# Patient Record
Sex: FEMALE | Race: WHITE | NOT HISPANIC OR LATINO | URBAN - NONMETROPOLITAN AREA
[De-identification: names, ages, dates, MRNs, and addresses within clinical notes are randomized per-mention and may not be internally consistent; named-entity substitution may affect disease eponyms.]

---

## 2017-03-06 ENCOUNTER — OUTSIDE FACILITY SERVICE (OUTPATIENT)
Dept: CARDIOLOGY | Facility: CLINIC | Age: 37
End: 2017-03-06

## 2017-03-06 PROCEDURE — 93306 TTE W/DOPPLER COMPLETE: CPT | Performed by: INTERNAL MEDICINE

## 2022-11-01 ENCOUNTER — TELEPHONE (OUTPATIENT)
Dept: UROLOGY | Age: 42
End: 2022-11-01

## 2022-11-01 NOTE — TELEPHONE ENCOUNTER
Patient states she is trying to transfer her care from Dr. Yung Heart office at [facility] to this office. She states she has tried to get them to fax records multiple times but they have been rude. I provided patient with office fax number. She states she will reach out to them again. Patient states she has had a lot of testing done and when she gets an appointment with this office she will provide a disk of her imaging.

## 2024-03-14 ENCOUNTER — PROCEDURE VISIT (OUTPATIENT)
Dept: OTOLARYNGOLOGY | Facility: CLINIC | Age: 44
End: 2024-03-14
Payer: COMMERCIAL

## 2024-03-14 ENCOUNTER — OFFICE VISIT (OUTPATIENT)
Dept: OTOLARYNGOLOGY | Facility: CLINIC | Age: 44
End: 2024-03-14
Payer: COMMERCIAL

## 2024-03-14 VITALS
BODY MASS INDEX: 34.83 KG/M2 | SYSTOLIC BLOOD PRESSURE: 130 MMHG | HEART RATE: 72 BPM | TEMPERATURE: 98.2 F | DIASTOLIC BLOOD PRESSURE: 94 MMHG | HEIGHT: 60 IN | WEIGHT: 177.38 LBS

## 2024-03-14 DIAGNOSIS — H81.11 BPPV (BENIGN PAROXYSMAL POSITIONAL VERTIGO), RIGHT: Primary | ICD-10-CM

## 2024-03-14 DIAGNOSIS — H81.11 BPPV (BENIGN PAROXYSMAL POSITIONAL VERTIGO), RIGHT: ICD-10-CM

## 2024-03-14 DIAGNOSIS — Z01.10 HEARING WITHIN NORMAL LIMITS IN BOTH EARS: ICD-10-CM

## 2024-03-14 DIAGNOSIS — H93.299: ICD-10-CM

## 2024-03-14 DIAGNOSIS — H93.13 TINNITUS, BILATERAL: ICD-10-CM

## 2024-03-14 DIAGNOSIS — R42 VERTIGO: Primary | ICD-10-CM

## 2024-03-14 DIAGNOSIS — R11.0 NAUSEA: ICD-10-CM

## 2024-03-14 RX ORDER — ATORVASTATIN CALCIUM 10 MG/1
TABLET, FILM COATED ORAL
COMMUNITY

## 2024-03-14 RX ORDER — MONTELUKAST SODIUM 10 MG/1
10 TABLET ORAL DAILY
COMMUNITY

## 2024-03-14 RX ORDER — ADALIMUMAB 80MG/0.8ML
KIT SUBCUTANEOUS
COMMUNITY

## 2024-03-14 RX ORDER — MOMETASONE FUROATE AND FORMOTEROL FUMARATE DIHYDRATE 100; 5 UG/1; UG/1
AEROSOL RESPIRATORY (INHALATION)
COMMUNITY

## 2024-03-14 RX ORDER — MECLIZINE HYDROCHLORIDE 25 MG/1
25 TABLET ORAL 3 TIMES DAILY PRN
Qty: 60 TABLET | Refills: 0 | Status: SHIPPED | OUTPATIENT
Start: 2024-03-14

## 2024-03-14 RX ORDER — ALBUTEROL SULFATE 90 UG/1
2 AEROSOL, METERED RESPIRATORY (INHALATION) EVERY 4 HOURS PRN
COMMUNITY
Start: 2023-04-19

## 2024-03-14 NOTE — PATIENT INSTRUCTIONS
Nasal steroid use:  Using nasal steroids:  You will be prescribed one of the following nasal steroids: Flonase, Nasacort, Nasonex, Rhinocort, Qnasl, Zetonna  2 puffs each nostril 2 times daily  Start as soon as possible  If you are using Afrin for 3 days with the nasal steroid,  Use Afrin first and wait 10 minutes to allow the nose to open. Then administer nasal steroids.     NASAL SALINE:  Use 2 puffs each nostril 4-6 times daily and more frequently if possible.  You can buy saline spray or you can make your own and use an old spray bottle to administer  Use a humidifier at bedside  Recipe for saline:  Water                                 1 quart  Salt (table)                        1 tablespoon  Gylcerin (or Shante Syrup)    1 teaspoon  Sodium bicarbonate           1 teaspoon  Sprays or Darshana pots are recommended    Do not allow to stand for more than 24 hrs. Make new solution. There is no preservative in this solution.    Sinus irrigation and saline application can be enhanced with various over-the-counter products.  A WaterPik can be quite useful to irrigate, especially following sinus surgery.  Navage makes a product that irrigates the nose and some of the sinuses.  NeilMed makes a sign you Gator to irrigate the sinuses.  Neomed also has canned saline that we will come out under pressure.  A De Leon pot can also be helpful.  All of these products help keep the nose clear of debris.  Please use as directed on the instructions that come with the particular device.     TINNITUS  Tinnitus (latin for ringing) is the sensation of noise in the ears. This symptom can be quite variable and disconcerting. Most people have had ringing in the ears but most do not require treatment. Only 6% of people have ringing troubling enough to seek treatment.    Symptoms can range from ringing to “noise” of any sort in the ear or ears. Timing can vary as well. There are no specific symptom requirements to have tinnitus. It is  speculated that the inner ear hair cells (responsible for hearing) may be dying and causing the brain to seek additional input for sound that is missing. There are many theories for the etiology of tinnitus or ringing.    You may be referred for hearing testing or imaging of the ears/brain to try to determine what is causing ringing and how to best treat the condition.    Tinnitus Recommendations-  >Avoid loud or sudden noise  >Wear hearing protection in the presence of loud noise  >Avoid irritants like caffeine, nicotine, tonic water, malaria medications, alcohol, aspirin- please tell MD if you are taking any of these medications  >In a quiet environment or while sleeping, use a white noise generator or radio station to provide background noise  >Relaxation and stress management techniques are useful  >Biofeedback  >Complementary medicine may be of benefit  >Wear a hearing aid or tinnitus masker/retrainer  >Psychological counseling may be beneficial in some situations  >Exercise!!  >Restorative Sleep!!    Medical therapy for ringing (may include)-  Do nothing  Medications for ringing  IV medication  Ear perfusion- inner ear surgery to reduce ringing  Hearing Aids, Tinnitus maskers, Tinnitus retrainers  Biofeedback  Psychological counseling    All options will be reviewed at your visit. Treating tinnitus can be difficult and frustrating. There really is no cure but rather control. This can be time consuming to treat. The patient determines how much treatment is warranted if there are no associated medical conditions requiring therapy. Please be patient.     VESTIBULAR EXERCISES:    Goals:  >To develop proprioceptive and visual mechanisms to compensate for a disturbance in balance function (labyrinthine system)  >To improve muscle coordination in general  T>o practice balancing under everyday conditions with special attention to using the eyes,  muscle and joint senses  >To train the movement of the eyes independent  "of the head  >To loosen the muscles of the neck and shoulder to overcome the protective muscular spasm and tendency to move \"in one piece\"  >To practice head movements that cause dizziness and thus gradually overcome the disability  >To encourage the restoration of self-confidence and easy spontaneous motion     Exercise Program:  A. Eye exercises (while seated in bed). Perform 5 to 10 minutes at least 5 times each day; first do slowly, then quickly. Perform 20 times each.  1.     Up and down  2.     Side to side  3.     Diagonal  4.     Focus on finger moving from 3 feet to one foot from face    B. Head exercises. To be done at first slowly with eyes open in primary position, then quickly. Later do with eyes closed. Perform 20 times each.  1.     Bending forward and backward  2.     Turning from side to side  3.     Tilting from side to side  4.     Diagonal movements    C. Coordinate the movement of head and eyes in the same direction as in B.    D. Shoulder shrugging and circling. Perform 20 times each.    E. While seated, bend forward and  objects from the ground. Perform 20 times.    F.  Standing exercises. Perform 20 times each.  1. Repeat section A  2. Change from a sitting to a standing position with the eyes open and shut.  3. Throw ball from hand to hand, above eye level             4. Throw ball from hand to hand, under knee  5. Change form from sitting to standing, turning around in between     Full activity: Perform 10 times each.  1. Walk across room with eyes open, then closed  2. Walk up and down a slope with eyes open, then closed  3. Walk up and down steps with eyes open, then closed  4. Sit up and lie down in bed  5. Stand up and sit down in a chair  6. Recover balance when pushed in any direction  7. Throw and catch a ball  8. Any game involving stooping, straining and aiming     The following are of value in rehabilitating patients with balance problems:  1. A light cane may be used, not " for walking, but to provide additional proprioceptive            orienting input.  2. While walking, the patient should not look down, but rather select a distant point for          fixation.  3. Night-lights should be left on in the patient’s home.  4. In-patients with cervical spine problems, especially in those that head turning        increase unsteadiness, the use of soft foam cervical collar limits motion and improves         stability. Exercises should be modified accordingly.  5. Mild central stimulants (Ritalin, Caffeine) are useful in alerting the patient to respond       quickly to orienting input.  6. Optimal visual correction should be achieved and maintained. If cataract surgery is           being performed, then contact lens are recommended to avoid optical distortion.  7.  Extreme fatigue and stress is to be avoided, as this may worsen the dizziness.  8.  Sedatives, vestibular suppressants and tranquilizers (Valium, Phenergan, Antivert, Dramamine, Klonopin, etc.) are to be avoided, as this may slow compensation by the brain and cause drowsiness.

## 2024-03-14 NOTE — PROGRESS NOTES
AUDIOMETRIC EVALUATION      Name:  Julee Todd  :  1980  Age:  44 y.o.  Date of Evaluation:  2024       History:  Ms. Todd is seen today for a hearing evaluation due to dizziness at the request of YISEL Cruz.    Audiologic Information:  Concerns for Hearing: No  PETs: No  Other otologic surgical history: No  Aural Pressure/Fullness: Bilateral  Otalgia: Bilateral  Otorrhea: No  Tinnitus: Intermittent bilateral ringing  Dizziness: lots of spinning/lightheaded- especially in a car  Noise Exposure: No  Family history of hearing loss: No  Head trauma requiring hospital stay: No  Chemotherapy: No  Other significant history: None    Vestibular Information:  Duration: Minutes (episodes last for a couple of days after being on a boat)  Triggers: in a car or a boat  Heart Problems: No  Back/Neck Problems: previous neck injury  Vision Problems: None  Nausea: yes  Weakness/Numbness: numbness when laying down  Motion Sickness: Yes- regularly  Migraine/Headache: No  Falls: No    **Case history obtained in office and through EMR system      EVALUATION:        RESULTS:    Otoscopic Evaluation:  Right: clear canal, tympanic membrane visualized  Left: clear canal, tympanic membrane visualized  **NOTE: Testing completed after ears were examined by ENT provider    Tympanometry (226 Hz):  Right: Type As  Left: Type As    Pure Tone Audiometry:    Right: Normal hearing thresholds  Left: Normal hearing thresholds    Speech Audiometry:   Right: Speech Reception Threshold (SRT) was obtained at 25 dB HL  Word Recognition scores - Excellent (100)% at 65 dB with 45 dB of contralateral masking, using NU-6 List 1A with 10 words  Left: Speech Reception Threshold (SRT) was obtained at 20 dB HL  Word Recognition scores - Excellent (100)% at 60 dB with 40 dB of contralateral masking, using NU-6 List 1A with 10 words  SRT/PTA in good agreement bilaterally.    Positional Screening:    Spontaneous Nystagmus Normal - No nystagmus  observed or recorded   Yanick-Hallpike Positive    Left No nystagmus observed or recorded   Right Nystagmus observed and recorded   Positional Normal   Supine No nystagmus observed or recorded   Head Right Slight nystagmus recorded.   Head Left No nystagmus observed or recorded       IMPRESSIONS:  Tympanometry showed normal middle ear pressure with reduced static compliance, consistent with hypomobile tympanic membrane, for both ears. Pure tone thresholds for both ears show normal hearing thresholds, suggesting normal outer/middle ear function and normal cochlear/retrocochlear function.Patient was counseled with regard to the findings.  Epley maneuver for right sided benign paroxysmal positional vertigo performed without incidence.      Diagnosis:  1. BPPV (benign paroxysmal positional vertigo), right    2. Tinnitus, bilateral    3. Hearing within normal limits in both ears         RECOMMENDATIONS/PLAN:  Follow-up recommendations per YISEL Cruz.  Practice good communication strategies to assist with everyday listening (eye contact with speakers, reduce background noise, encourage others to communicate clearly and slowly).  Tinnitus management strategies. Consider use of amplification, a white noise machine, low level TV/radio, or fan at night to help mask the tinnitus. It is also recommended to avoid caffeine, alcohol, tobacco, salt, high dose NSAIDs, and to increase hydration. Additional resources: Resound Relief misty and American Tinnitus Association (www.oliver.org).  Consistent utilization of hearing protection devices in noisy environments.  Repeat hearing evaluation if changes in hearing are noted or concerns arise.  Discussed results and recommendations with patient. Questions were addressed and the patient was encouraged to contact our department should concerns arise.        Padmini Wesley, CCC-A, F-AAA  Doctor of Audiology

## 2024-03-14 NOTE — PROGRESS NOTES
YISEL Sanabria  W ENT [facility] EAR NOSE & THROAT  [address], [address]  [place] [address]  Fax [phone]  Phone [phone]      Visit Type: NEW PATIENT   Chief Complaint   Patient presents with    Dizziness    Hearing Loss           Dizziness      Julee Todd is a 44 y.o. female who presents for evaluation of ent complaints. Main complaint today is dizziness. Describes dizziness as spinning and tipping sensation. Spinning it to the right. Triggered by car and boat ride. Episodes can last for several minutes to days. Associated symptoms include nausea. Rest sometimes makes better but denies significant measures to stop or lessen episodes. Takes nausea medicine that helps somewhat.  This has been present for past 2-3 years, seems to be worsening. Has not been worked up for this.     Worsening factors- crowds and back ground noise make hearing worse    Past Medical History:   Diagnosis Date    Asthma     Hidradenitis suppurativa     Hyperlipidemia        Past Surgical History:   Procedure Laterality Date    APPENDECTOMY       SECTION      HYSTERECTOMY         Family History: Her family history includes Skin cancer in her father.     Social History: She  reports that she has never smoked. She has never used smokeless tobacco. She reports that she does not drink alcohol and does not use drugs.    Home Medications:  Adalimumab, albuterol sulfate HFA, atorvastatin, meclizine, mometasone-formoterol, and montelukast    Allergies:  She has No Known Allergies.       Vital Signs:   Temp:  [98.2 °F (36.8 °C)] 98.2 °F (36.8 °C)  Heart Rate:  [72] 72  BP: (130)/(94) 130/94  ENT Physical Exam  Constitutional  Appearance: patient appears well-developed, well-nourished and well-groomed,  Communication/Voice: communication appropriate for developmental age; vocal quality normal;  Head and Face  Appearance: head appears normal, face appears normal and  face appears atraumatic;  Palpation: facial palpation normal;  Salivary: glands normal;  Ear  Hearing: intact;  Auricles: bilateral auricles normal;  External Mastoids: bilateral external mastoids normal;  Ear Canals: bilateral ear canals normal;  Tympanic Membranes: bilateral tympanic membranes normal;  Nose  External Nose: nares patent bilaterally; external nose normal;  Internal Nose: bilateral intranasal mucosa erythematous; bilateral inferior turbinates bluish;  Oral Cavity/Oropharynx  Lips: normal;  Teeth: normal;  Gums: gingiva normal;  Tongue: normal;  Oral mucosa: normal;  Hard palate: normal;  Soft palate: normal;  Tonsils: normal;  Neck  Neck: neck normal; neck palpation normal;  Respiratory  Inspection: breathing unlabored; normal breathing rate;  Cardiovascular  Inspection: extremities are warm and well perfused;  Neurovestibular  Gait: gait normal;  Station: standing is normal;  Vestibular: gaze-evoked nystagmus present; Vestibular comments: horizontal beating nystagmus noted with forced gaze to right  Mental Status: alert and oriented;           Result Review       RESULTS REVIEW    I have reviewed the patients old records in the chart.   The following results/records were reviewed:    Procedure visit with Padmini Mccracken AUD (03/14/2024)          Assessment & Plan  Vertigo    Nausea    Altered auditory perception    BPPV (benign paroxysmal positional vertigo), right           New Medications Ordered This Visit   Medications    meclizine (ANTIVERT) 25 MG tablet     Sig: Take 1 tablet by mouth 3 (Three) Times a Day As Needed for Dizziness (vertigo).     Dispense:  60 tablet     Refill:  0       Fleischmanns figueroa pike positive to right. Will treat for BPPV. Epley performed. Home exercises discussed and advised starting next week. Can use Meclizine as needed.   WE will monitor audio for now without significant abnormalities. Advised to try flonase and ear popping, can recheck hearing in 3-6 months to monitor  for change. If new symptoms develop care recheck sooner if needed.    Medical and surgical options were discussed including observation, continued medical management, medication modification, and surgical management. Risks, benefits and alternatives were discussed and questions were answered. After considering the options, the patient decided to proceed with medication modification.  Call for ear problems, especially change of hearing, ear pain or dizziness.  For the best results, use nasal sprays every day. It may take a week to build up in the nasal lining and a few more weeks to improve the eustachian tube function.  Protect getting water in the ears. If needed, may use over the counter silicone plugs or a cotton ball coated with vasoline when bathing.  Use hairdryer on a cool setting after bathing.  Use hearing protection in loud noise situations.  Use home masking techniques- use low sound level of a pleasant sound like a fan or radio at night to drown out the tinnitus sound. If not working, can consider formal masking device through our hearing aid department.  Follow a low salt diet to try to prevent inner ear fluid accumulation.  Decrease caffeine, avoid red wine, nitrites in cured meats, food additives (like monosodium glutamate/ MSG) and dyes.  EPLY MANEUVER INSTRUCTIONS: Sleep upright for the next 24 hours with the head elevated greater than 30 degrees. Refrain from bending, straining, or abrupt head movements for the next 2 weeks.   Home vestibular exercises  Start Flonase BID      Return in about 6 weeks (around 4/25/2024) for Recheck vertigo.        Electronically signed by YISEL Sanabria 03/14/24 2:49 PM CDT.

## 2024-03-22 ENCOUNTER — TELEPHONE (OUTPATIENT)
Dept: OTOLARYNGOLOGY | Facility: CLINIC | Age: 44
End: 2024-03-22
Payer: COMMERCIAL

## 2024-03-22 NOTE — TELEPHONE ENCOUNTER
Called pt and informed her to start taking the meclizine and I would notify the APRN's nurse and they will get back with her next week. Pt verbalized understanding.

## 2024-03-22 NOTE — TELEPHONE ENCOUNTER
Pt LVM stating that her eply maneuver did not help. She states that the aprn said to hold off on taking meclizine to see if the eply would help first. Pt asked if she can start using the meclizine?

## 2024-04-09 ENCOUNTER — TELEPHONE (OUTPATIENT)
Dept: OTOLARYNGOLOGY | Facility: CLINIC | Age: 44
End: 2024-04-09
Payer: COMMERCIAL

## 2024-04-09 NOTE — TELEPHONE ENCOUNTER
Spoke with patient, and advised that per [place] the patient needed to come in and have another Epley maneuver and follow up with [place], spoke with patient and scheduled appointment for 4/15 at 130. Patient VU

## 2024-04-15 ENCOUNTER — OFFICE VISIT (OUTPATIENT)
Dept: OTOLARYNGOLOGY | Facility: CLINIC | Age: 44
End: 2024-04-15
Payer: COMMERCIAL

## 2024-04-15 ENCOUNTER — PROCEDURE VISIT (OUTPATIENT)
Dept: OTOLARYNGOLOGY | Facility: CLINIC | Age: 44
End: 2024-04-15
Payer: COMMERCIAL

## 2024-04-15 VITALS
BODY MASS INDEX: 34.99 KG/M2 | HEART RATE: 73 BPM | WEIGHT: 178.2 LBS | DIASTOLIC BLOOD PRESSURE: 95 MMHG | HEIGHT: 60 IN | TEMPERATURE: 98.4 F | SYSTOLIC BLOOD PRESSURE: 141 MMHG

## 2024-04-15 DIAGNOSIS — H81.10 BPPV (BENIGN PAROXYSMAL POSITIONAL VERTIGO), UNSPECIFIED LATERALITY: ICD-10-CM

## 2024-04-15 DIAGNOSIS — H81.12 BPPV (BENIGN PAROXYSMAL POSITIONAL VERTIGO), LEFT: Primary | ICD-10-CM

## 2024-04-15 DIAGNOSIS — R11.0 NAUSEA: ICD-10-CM

## 2024-04-15 DIAGNOSIS — R42 VERTIGO: Primary | ICD-10-CM

## 2024-04-15 DIAGNOSIS — H93.13 TINNITUS, BILATERAL: ICD-10-CM

## 2024-04-15 PROCEDURE — 92567 TYMPANOMETRY: CPT

## 2024-04-15 PROCEDURE — 99213 OFFICE O/P EST LOW 20 MIN: CPT | Performed by: NURSE PRACTITIONER

## 2024-04-15 PROCEDURE — 95992 CANALITH REPOSITIONING PROC: CPT

## 2024-04-15 RX ORDER — PLECANATIDE 3 MG/1
TABLET ORAL
COMMUNITY

## 2024-04-15 RX ORDER — FLUOXETINE HCL 10 MG
CAPSULE ORAL
COMMUNITY
Start: 2024-03-22

## 2024-04-15 RX ORDER — METOCLOPRAMIDE 10 MG/1
TABLET ORAL
COMMUNITY

## 2024-04-15 NOTE — PROGRESS NOTES
YISEL Sanabria  REYNA ENT [facility] EAR NOSE & THROAT  [address], [address]  [place] [address]  Fax [phone]  Phone [phone]      Visit Type: FOLLOW UP   Chief Complaint   Patient presents with    Dizziness           HPI  Julee Todd is a 44 y.o. female who presents for Follow up evaluation, recheck on vertigo. She reports since last visit symptoms have continued, reports epley at last visit and meclizine have not really been helping. Still having daily episodes of vertigo. She reports she has been working with a  and states symptoms have seemed to worsen with this specifically, with laying on the ground and getting back up quickly while working out.         Past Medical History:   Diagnosis Date    Asthma     Hidradenitis suppurativa     Hyperlipidemia        Past Surgical History:   Procedure Laterality Date    APPENDECTOMY       SECTION      HYSTERECTOMY         Family History: Her family history includes Skin cancer in her father.     Social History: She  reports that she has never smoked. She has never used smokeless tobacco. She reports that she does not drink alcohol and does not use drugs.    Home Medications:  Adalimumab, FLUoxetine, Plecanatide, albuterol sulfate HFA, atorvastatin, meclizine, metoclopramide, mometasone-formoterol, montelukast, and tamsulosin    Allergies:  She has No Known Allergies.       Vital Signs:   Temp:  [98.4 °F (36.9 °C)] 98.4 °F (36.9 °C)  Heart Rate:  [73] 73  BP: (141)/(95) 141/95  ENT Physical Exam  Constitutional  Appearance: patient appears well-developed, well-nourished and well-groomed,  Communication/Voice: communication appropriate for developmental age; vocal quality normal;  Head and Face  Appearance: head appears normal, face appears normal and face appears atraumatic;  Palpation: facial palpation normal;  Salivary: glands normal;  Ear  Hearing: intact;  Auricles: bilateral  auricles normal;  External Mastoids: bilateral external mastoids normal;  Ear Canals: bilateral ear canals normal;  Tympanic Membranes: bilateral tympanic membranes normal;  Nose  External Nose: nares patent bilaterally; external nose normal;  Internal Nose: bilateral intranasal mucosa erythematous; bilateral inferior turbinates bluish;  Oral Cavity/Oropharynx  Lips: normal;  Teeth: normal;  Gums: gingiva normal;  Tongue: normal;  Oral mucosa: normal;  Hard palate: normal;  Soft palate: normal;  Tonsils: normal;  Neck  Neck: neck normal; neck palpation normal;  Respiratory  Inspection: breathing unlabored; normal breathing rate;  Cardiovascular  Inspection: extremities are warm and well perfused;  Neurovestibular  Gait: gait normal;  Station: standing is normal;  Vestibular: gaze-evoked nystagmus present; Vestibular comments: horizontal beating nystagmus noted with forced gaze to right and left  Mental Status: alert and oriented;           Result Review       RESULTS REVIEW    I have reviewed the patients old records in the chart.   The following results/records were reviewed:     Procedure visit with Padmini Mccracken AUD (04/15/2024)          Assessment & Plan  Vertigo    Nausea    BPPV (benign paroxysmal positional vertigo), unspecified laterality    Tinnitus, bilateral              Yanick figueroa pike today shows left beating nystagmus today, reported as non-fading but with forced gaze she does have some bilateral horizontal nystagmus, left worse than right, that spontaneously resolves after a few beats. We discussed treatment options including continue conservative measures, vestibular therapy, or further testing. She is agreeable to try vestibular rehab, I also advised her to hold off on  exercises until we have a more stabilized condition, she will continue basic home measures. IF this does not improve condition we will more than likely consider further testing.     Call for ear problems,  especially change of hearing, ear pain or dizziness.  For the best results, use nasal sprays every day. It may take a week to build up in the nasal lining and a few more weeks to improve the eustachian tube function.  Protect getting water in the ears. If needed, may use over the counter silicone plugs or a cotton ball coated with vasoline when bathing.  Use hairdryer on a cool setting after bathing.  Use hearing protection in loud noise situations.  Follow a low salt diet to try to prevent inner ear fluid accumulation.  Decrease caffeine, avoid red wine, nitrites in cured meats, food additives (like monosodium glutamate/ MSG) and dyes.  EPLY MANEUVER INSTRUCTIONS: Sleep upright for the next 24 hours with the head elevated greater than 30 degrees. Refrain from bending, straining, or abrupt head movements for the next 2 weeks.   Will refer out to vestibular therapy.  Continue home vestibular exercises and interventions. Can use Meclizine as needed.     Call with questions or concerns, if symptoms worsen    Return in about 2 months (around 6/15/2024) for Recheck.        Electronically signed by YISEL Sanabria 04/15/24 3:01 PM CDT.

## 2024-04-15 NOTE — PATIENT INSTRUCTIONS
"VESTIBULAR EXERCISES:    Goals:  >To develop proprioceptive and visual mechanisms to compensate for a disturbance in balance function (labyrinthine system)  >To improve muscle coordination in general  T>o practice balancing under everyday conditions with special attention to using the eyes,  muscle and joint senses  >To train the movement of the eyes independent of the head  >To loosen the muscles of the neck and shoulder to overcome the protective muscular spasm and tendency to move \"in one piece\"  >To practice head movements that cause dizziness and thus gradually overcome the disability  >To encourage the restoration of self-confidence and easy spontaneous motion     Exercise Program:  A. Eye exercises (while seated in bed). Perform 5 to 10 minutes at least 5 times each day; first do slowly, then quickly. Perform 20 times each.  1.     Up and down  2.     Side to side  3.     Diagonal  4.     Focus on finger moving from 3 feet to one foot from face    B. Head exercises. To be done at first slowly with eyes open in primary position, then quickly. Later do with eyes closed. Perform 20 times each.  1.     Bending forward and backward  2.     Turning from side to side  3.     Tilting from side to side  4.     Diagonal movements    C. Coordinate the movement of head and eyes in the same direction as in B.    D. Shoulder shrugging and circling. Perform 20 times each.    E. While seated, bend forward and  objects from the ground. Perform 20 times.    F.  Standing exercises. Perform 20 times each.  1. Repeat section A  2. Change from a sitting to a standing position with the eyes open and shut.  3. Throw ball from hand to hand, above eye level             4. Throw ball from hand to hand, under knee  5. Change form from sitting to standing, turning around in between     Full activity: Perform 10 times each.  1. Walk across room with eyes open, then closed  2. Walk up and down a slope with eyes open, then closed  3. " Walk up and down steps with eyes open, then closed  4. Sit up and lie down in bed  5. Stand up and sit down in a chair  6. Recover balance when pushed in any direction  7. Throw and catch a ball  8. Any game involving stooping, straining and aiming     The following are of value in rehabilitating patients with balance problems:  1. A light cane may be used, not for walking, but to provide additional proprioceptive            orienting input.  2. While walking, the patient should not look down, but rather select a distant point for          fixation.  3. Night-lights should be left on in the patient’s home.  4. In-patients with cervical spine problems, especially in those that head turning        increase unsteadiness, the use of soft foam cervical collar limits motion and improves         stability. Exercises should be modified accordingly.  5. Mild central stimulants (Ritalin, Caffeine) are useful in alerting the patient to respond       quickly to orienting input.  6. Optimal visual correction should be achieved and maintained. If cataract surgery is           being performed, then contact lens are recommended to avoid optical distortion.  7.  Extreme fatigue and stress is to be avoided, as this may worsen the dizziness.  8.  Sedatives, vestibular suppressants and tranquilizers (Valium, Phenergan, Antivert, Dramamine, Klonopin, etc.) are to be avoided, as this may slow compensation by the brain and cause drowsiness.

## 2024-04-23 ENCOUNTER — TELEPHONE (OUTPATIENT)
Dept: OTOLARYNGOLOGY | Facility: CLINIC | Age: 44
End: 2024-04-23
Payer: COMMERCIAL

## 2024-04-23 NOTE — TELEPHONE ENCOUNTER
Spoke with patient and advised that I was faxing the referral to [facility] in [place], and they should be contacting her for an appointment. Patient SHAWN

## 2024-04-23 NOTE — TELEPHONE ENCOUNTER
----- Message from Hanna Day MA sent at 4/23/2024 11:43 AM CDT -----  Regarding: FW: Vestibular Rehab Referral  Contact: [phone]    ----- Message -----  From: Julee Todd  Sent: 4/23/2024  11:28 AM CDT  To: [facility]  Subject: Vestibular Rehab Referral                        Just checking back to see if this has been taken care of to schedule my Vestibular Rehab

## 2024-06-05 ENCOUNTER — OFFICE VISIT (OUTPATIENT)
Dept: OTOLARYNGOLOGY | Facility: CLINIC | Age: 44
End: 2024-06-05
Payer: COMMERCIAL

## 2024-06-05 VITALS
WEIGHT: 170 LBS | HEART RATE: 73 BPM | SYSTOLIC BLOOD PRESSURE: 118 MMHG | TEMPERATURE: 97.5 F | BODY MASS INDEX: 33.38 KG/M2 | HEIGHT: 60 IN | DIASTOLIC BLOOD PRESSURE: 80 MMHG

## 2024-06-05 DIAGNOSIS — Z01.10 HEARING WITHIN NORMAL LIMITS IN BOTH EARS: ICD-10-CM

## 2024-06-05 DIAGNOSIS — H93.13 TINNITUS, BILATERAL: ICD-10-CM

## 2024-06-05 DIAGNOSIS — H81.12 BPPV (BENIGN PAROXYSMAL POSITIONAL VERTIGO), LEFT: ICD-10-CM

## 2024-06-05 DIAGNOSIS — R42 VERTIGO: ICD-10-CM

## 2024-06-05 DIAGNOSIS — R11.0 NAUSEA: ICD-10-CM

## 2024-06-05 DIAGNOSIS — H81.10 BPPV (BENIGN PAROXYSMAL POSITIONAL VERTIGO), UNSPECIFIED LATERALITY: Primary | ICD-10-CM

## 2024-06-05 NOTE — PROGRESS NOTES
"    YISEL Sanabria  [facility]  [address], [address]  [place] [address]  Fax [phone]  Phone [phone]      Visit Type: FOLLOW UP   Chief Complaint   Patient presents with    Dizziness     Better since vest rehab           HPI  Julee Todd is a 44 y.o. female who presents for follow up, recheck vertigo. Since last visit she has been to vestibular rehab, feels this significantly helped. \"Night and day difference\". She reports she is now able to drive and go out on boat without issues. States vertigo seems to be resolved. She is happy with treatment/progress.     She does report she still has some occasional tinnitus, fairly infrequent, tolerable.     Past Medical History:   Diagnosis Date    Asthma     Dizziness     Hidradenitis suppurativa     HL (hearing loss)     Hyperlipidemia     Tinnitus        Past Surgical History:   Procedure Laterality Date    ADENOIDECTOMY      APPENDECTOMY       SECTION      HYSTERECTOMY         Family History: Her family history includes Hypertension in her father; Migraines in her mother; Skin cancer in her father.     Social History: She  reports that she has never smoked. She has never used smokeless tobacco. She reports that she does not drink alcohol and does not use drugs.    Home Medications:  Adalimumab, FLUoxetine, albuterol sulfate HFA, atorvastatin, meclizine, metoclopramide, mometasone-formoterol, and montelukast    Allergies:  She has No Known Allergies.       Vital Signs:   Temp:  [97.5 °F (36.4 °C)] 97.5 °F (36.4 °C)  Heart Rate:  [73] 73  BP: (118)/(80) 118/80  ENT Physical Exam  Constitutional  Appearance: patient appears well-developed, well-nourished and well-groomed,  Communication/Voice: communication appropriate for developmental age; vocal quality normal;  Head and Face  Appearance: head appears normal, face appears normal and face appears atraumatic;  Palpation: " facial palpation normal;  Salivary: glands normal;  Ear  Hearing: intact;  Auricles: bilateral auricles normal;  External Mastoids: bilateral external mastoids normal;  Ear Canals: bilateral ear canals normal;  Tympanic Membranes: bilateral tympanic membranes normal;  Nose  External Nose: nares patent bilaterally; external nose normal;  Internal Nose: bilateral intranasal mucosa erythematous; bilateral inferior turbinates bluish;  Oral Cavity/Oropharynx  Lips: normal;  Teeth: normal;  Gums: gingiva normal;  Tongue: normal;  Oral mucosa: normal;  Hard palate: normal;  Soft palate: normal;  Tonsils: normal;  Neck  Neck: neck normal; neck palpation normal;  Respiratory  Inspection: breathing unlabored; normal breathing rate;  Cardiovascular  Inspection: extremities are warm and well perfused;  Neurovestibular  Gait: gait normal;  Station: standing is normal;  Vestibular: no pathologic nystagmus;  Mental Status: alert and oriented;           Result Review       RESULTS REVIEW    I have reviewed the patients old records in the chart.           Assessment & Plan  BPPV (benign paroxysmal positional vertigo), unspecified laterality    Nausea    BPPV (benign paroxysmal positional vertigo), left    Vertigo    Hearing within normal limits in both ears    Tinnitus, bilateral                She appears much improved, She reports vertigo has resolved. No further intervention need at this time. I advised on basic home vertigo precautions. She will call if any symptoms return. We will plan for 6 month recheck.   Continue low salt diet  Exercise  Tinnitus is tolerable, trial basic home tinnitus recommendations with masking, sleep hygiene and stress reduction. She will call if tinnitus worsens.     Call with questions or concerns.     Return in about 6 months (around 12/5/2024) for Recheck vertigo.        Electronically signed by YISEL Sanabria 06/05/24 2:34 PM CDT.

## 2025-03-18 ENCOUNTER — LAB (OUTPATIENT)
Dept: LAB | Facility: HOSPITAL | Age: 45
End: 2025-03-18
Payer: COMMERCIAL

## 2025-03-18 ENCOUNTER — TRANSCRIBE ORDERS (OUTPATIENT)
Dept: ADMINISTRATIVE | Facility: HOSPITAL | Age: 45
End: 2025-03-18
Payer: COMMERCIAL

## 2025-03-18 DIAGNOSIS — Z79.899 NEED FOR PROPHYLACTIC CHEMOTHERAPY: ICD-10-CM

## 2025-03-18 DIAGNOSIS — Z79.899 NEED FOR PROPHYLACTIC CHEMOTHERAPY: Primary | ICD-10-CM

## 2025-03-18 LAB
ALBUMIN SERPL-MCNC: 4.2 G/DL (ref 3.5–5)
ALBUMIN/GLOB SERPL: 1.4 G/DL (ref 1.1–2.5)
ALP SERPL-CCNC: 85 U/L (ref 24–120)
ALT SERPL W P-5'-P-CCNC: 28 U/L (ref 0–35)
ANION GAP SERPL CALCULATED.3IONS-SCNC: 9 MMOL/L (ref 4–13)
AST SERPL-CCNC: 25 U/L (ref 7–45)
AUTO MIXED CELLS #: 0.5 10*3/MM3 (ref 0.1–2.6)
AUTO MIXED CELLS %: 9.7 % (ref 0.1–24)
BILIRUB SERPL-MCNC: 0.4 MG/DL (ref 0.1–1)
BUN SERPL-MCNC: 9 MG/DL (ref 5–21)
BUN/CREAT SERPL: 11.3
CALCIUM SPEC-SCNC: 8.7 MG/DL (ref 8.6–10.5)
CHLORIDE SERPL-SCNC: 101 MMOL/L (ref 98–110)
CO2 SERPL-SCNC: 24 MMOL/L (ref 24–31)
CREAT SERPL-MCNC: 0.8 MG/DL (ref 0.5–1.4)
EGFRCR SERPLBLD CKD-EPI 2021: 91.6 ML/MIN/1.73
ERYTHROCYTE [DISTWIDTH] IN BLOOD BY AUTOMATED COUNT: 12.9 % (ref 12.3–15.4)
GLOBULIN UR ELPH-MCNC: 3 GM/DL
GLUCOSE SERPL-MCNC: 87 MG/DL (ref 65–99)
HCT VFR BLD AUTO: 43 % (ref 34–46.6)
HGB BLD-MCNC: 14 G/DL (ref 12–15.9)
LYMPHOCYTES # BLD AUTO: 2.3 10*3/MM3 (ref 0.7–3.1)
LYMPHOCYTES NFR BLD AUTO: 40.9 % (ref 19.6–45.3)
MCH RBC QN AUTO: 28.7 PG (ref 26.6–33)
MCHC RBC AUTO-ENTMCNC: 32.6 G/DL (ref 31.5–35.7)
MCV RBC AUTO: 88.1 FL (ref 79–97)
NEUTROPHILS NFR BLD AUTO: 2.8 10*3/MM3 (ref 1.7–7)
NEUTROPHILS NFR BLD AUTO: 49.4 % (ref 42.7–76)
PLATELET # BLD AUTO: 202 10*3/MM3 (ref 140–450)
PMV BLD AUTO: 9.6 FL (ref 6–12)
POTASSIUM SERPL-SCNC: 3.9 MMOL/L (ref 3.5–5.3)
PROT SERPL-MCNC: 7.2 G/DL (ref 6.3–8.7)
RBC # BLD AUTO: 4.88 10*6/MM3 (ref 3.77–5.28)
SODIUM SERPL-SCNC: 134 MMOL/L (ref 135–145)
WBC NRBC COR # BLD AUTO: 5.6 10*3/MM3 (ref 3.4–10.8)

## 2025-03-18 PROCEDURE — 86480 TB TEST CELL IMMUN MEASURE: CPT

## 2025-03-18 PROCEDURE — 80053 COMPREHEN METABOLIC PANEL: CPT

## 2025-03-18 PROCEDURE — 36415 COLL VENOUS BLD VENIPUNCTURE: CPT

## 2025-03-18 PROCEDURE — 85025 COMPLETE CBC W/AUTO DIFF WBC: CPT

## 2025-03-21 LAB
GAMMA INTERFERON BACKGROUND BLD IA-ACNC: 0.06 IU/ML
M TB IFN-G BLD-IMP: NEGATIVE
M TB IFN-G CD4+ BCKGRND COR BLD-ACNC: 0.08 IU/ML
M TB IFN-G CD4+CD8+ BCKGRND COR BLD-ACNC: 0.08 IU/ML
MITOGEN IGNF BCKGRD COR BLD-ACNC: >10 IU/ML
QUANTIFERON INCUBATION: NORMAL
SERVICE CMNT-IMP: NORMAL